# Patient Record
Sex: MALE | Race: WHITE
[De-identification: names, ages, dates, MRNs, and addresses within clinical notes are randomized per-mention and may not be internally consistent; named-entity substitution may affect disease eponyms.]

---

## 2022-10-23 ENCOUNTER — HOSPITAL ENCOUNTER (EMERGENCY)
Dept: HOSPITAL 75 - ER | Age: 22
Discharge: HOME | End: 2022-10-23
Payer: MEDICAID

## 2022-10-23 VITALS — HEIGHT: 67.99 IN | BODY MASS INDEX: 20.38 KG/M2 | WEIGHT: 134.48 LBS

## 2022-10-23 VITALS — DIASTOLIC BLOOD PRESSURE: 74 MMHG | SYSTOLIC BLOOD PRESSURE: 120 MMHG

## 2022-10-23 DIAGNOSIS — R51.9: ICD-10-CM

## 2022-10-23 DIAGNOSIS — F17.290: ICD-10-CM

## 2022-10-23 DIAGNOSIS — M54.2: Primary | ICD-10-CM

## 2022-10-23 DIAGNOSIS — Z28.310: ICD-10-CM

## 2022-10-23 PROCEDURE — 99281 EMR DPT VST MAYX REQ PHY/QHP: CPT

## 2022-10-23 NOTE — ED HEADACHE
General


Chief Complaint:  Head/Cervical Problems


Stated Complaint:  REFERRED FROM [facility] POSSIBLE MENINGITIS


Nursing Triage Note:  


pt states he has had a migrane and fever for abut 6 days, neck pain for about 


3-4 days, also states L testicle pain, white discharge and buring with 


urination.


Source:  patient


Exam Limitations:  no limitations





History of Present Illness


Date Seen by Provider:  Oct 23, 2022


Time Seen by Provider:  13:45


Initial Comments


Patient is a 20 yo M who presents to the ED with headache and cervical neck pain

for approximately 6 days. He states he had a fever for the first few days of the

headache but denies any fever in the last 48 hours. He was seen at a walk-in 

clinic and referred here for concern for meningitis. Patient states he does have

increased pain with lateral movement of his neck but denies any pain with 

flexion/extension. No vomiting, vision change, focal weakness/numbness, 

dizziness. Patient states he took one of his family's muscle relaxers a few days

ago but he has had nothing since for the symptoms.


Location:  frontal





Allergies and Home Medications


Allergies


Coded Allergies:  


     sulfamethoxazole (Verified  Allergy, Unknown, 10/23/22)


     trimethoprim (Verified  Allergy, Unknown, 10/23/22)





Patient Home Medication List


Home Medication List Reviewed:  Yes





Review of Systems


Review of Systems


Constitutional:  no symptoms reported


Eyes:  No Symptoms Reported


Ears, Nose, Mouth, Throat:  no symptoms reported


Respiratory:  no symptoms reported


Cardiovascular:  no symptoms reported


Gastrointestinal:  no symptoms reported


Genitourinary:  no symptoms reported


Musculoskeletal:  neck pain


Skin:  no symptoms reported


Psychiatric/Neurological:  Headache





Past Medical-Social-Family Hx


Patient Social History


Smokeless Tobacco Frequency:  Current Everyday User


Use of E-Cig and/or Vaping dev:  Yes


E-Cig or Vaping type used:  Nicotine


Alcohol Use?:  No


Pt feels they are or have been:  No





Physical Exam


Vital Signs





Vital Signs - First Documented








 10/23/22





 13:34


 


Temp 35.3


 


Pulse 60


 


Resp 18


 


B/P (MAP) 118/68 (85)


 


Pulse Ox 99


 


O2 Delivery Room Air





Capillary Refill :


Height, Weight, BMI


Height: '"


Weight: lbs. oz. kg; 20.00 BMI


Method:


General Appearance:  WD/WN, no apparent distress


HEENT:  PERRL/EOMI, normal ENT inspection


Neck:  non-tender, full range of motion


Cardiovascular:  regular rate, rhythm


Respiratory:  chest non-tender, lungs clear, normal breath sounds, no 

respiratory distress, no accessory muscle use


Gastrointestinal:  normal bowel sounds, non tender, soft


Back:  normal inspection


Extremities:  normal range of motion, non-tender


Skin:  normal color, warm/dry





Progress/Results/Core Measures


Results/Orders


My Orders





Orders - JESSICA MATA


Prochlorperazine Tablet (Compazine Table (10/23/22 14:00)


Ibuprofen  Tablet (Motrin  Tablet) (10/23/22 14:00)





Vital Signs/I&O











 10/23/22





 13:34


 


Temp 35.3


 


Pulse 60


 


Resp 18


 


B/P (MAP) 118/68 (85)


 


Pulse Ox 99


 


O2 Delivery Room Air














Blood Pressure Mean:                    85











Progress


Progress Note :  


Progress Note


Patient is nontoxic and well hydrated on exam. Vital signs are stable. No nuchal

rigidity noted. Pt was offered IM medications for his headache and he declined 

stating he would like oral ones. Rates head pain 4/10. I have very low suspicion

for meningitis. No indication for cross sectional imaging of the head as there 

are no focal neurologic deficits. Will d/c home with recs for supportive care 

and follow-up with PCP for persistent symptoms. Return precautions for urgent 

symptomology discussed. Patient verbalized understanding.





Departure


Impression





   Primary Impression:  


   Headache


   Qualified Codes:  R51.9 - Headache, unspecified


   Additional Impression:  


   Cervical pain


Disposition:  01 HOME, SELF-CARE


Condition:  Stable





Departure-Patient Inst.


Decision time for Depature:  13:55


Referrals:  


[facility] (PCP/Family)


Primary Care Physician


Patient Instructions:  Headache, Adult (DC), Neck Pain ED


Scripts


Ibuprofen (Ibuprofen) 600 Mg Tablet


600 MG PO Q6H PRN for PAIN-MILD for 5 Days, #20 TAB 0 Refills


   Prov: JESSICA MATA         10/23/22 


Prochlorperazine Maleate (Compazine) 10 Mg Tablet


10 MG PO Q8H PRN for HEADACHE for 5 Days, #15 TAB 0 Refills


   Prov: JESSICA MATA         10/23/22











JESSICA MATA             Oct 23, 2022 13:57